# Patient Record
Sex: FEMALE | Race: WHITE
[De-identification: names, ages, dates, MRNs, and addresses within clinical notes are randomized per-mention and may not be internally consistent; named-entity substitution may affect disease eponyms.]

---

## 2018-11-12 ENCOUNTER — HOSPITAL ENCOUNTER (EMERGENCY)
Dept: HOSPITAL 89 - ER | Age: 18
Discharge: HOME | End: 2018-11-12
Payer: COMMERCIAL

## 2018-11-12 VITALS — DIASTOLIC BLOOD PRESSURE: 62 MMHG | SYSTOLIC BLOOD PRESSURE: 111 MMHG

## 2018-11-12 DIAGNOSIS — R42: Primary | ICD-10-CM

## 2018-11-12 DIAGNOSIS — F12.929: ICD-10-CM

## 2018-11-12 LAB — PLATELET COUNT, AUTOMATED: 331 K/UL (ref 150–450)

## 2018-11-12 PROCEDURE — 84450 TRANSFERASE (AST) (SGOT): CPT

## 2018-11-12 PROCEDURE — 99284 EMERGENCY DEPT VISIT MOD MDM: CPT

## 2018-11-12 PROCEDURE — 84075 ASSAY ALKALINE PHOSPHATASE: CPT

## 2018-11-12 PROCEDURE — 82374 ASSAY BLOOD CARBON DIOXIDE: CPT

## 2018-11-12 PROCEDURE — 82947 ASSAY GLUCOSE BLOOD QUANT: CPT

## 2018-11-12 PROCEDURE — 80305 DRUG TEST PRSMV DIR OPT OBS: CPT

## 2018-11-12 PROCEDURE — 82310 ASSAY OF CALCIUM: CPT

## 2018-11-12 PROCEDURE — 84155 ASSAY OF PROTEIN SERUM: CPT

## 2018-11-12 PROCEDURE — 84484 ASSAY OF TROPONIN QUANT: CPT

## 2018-11-12 PROCEDURE — 84460 ALANINE AMINO (ALT) (SGPT): CPT

## 2018-11-12 PROCEDURE — 81001 URINALYSIS AUTO W/SCOPE: CPT

## 2018-11-12 PROCEDURE — 85025 COMPLETE CBC W/AUTO DIFF WBC: CPT

## 2018-11-12 PROCEDURE — 93005 ELECTROCARDIOGRAM TRACING: CPT

## 2018-11-12 PROCEDURE — 80320 DRUG SCREEN QUANTALCOHOLS: CPT

## 2018-11-12 PROCEDURE — 82040 ASSAY OF SERUM ALBUMIN: CPT

## 2018-11-12 PROCEDURE — 82247 BILIRUBIN TOTAL: CPT

## 2018-11-12 PROCEDURE — 84520 ASSAY OF UREA NITROGEN: CPT

## 2018-11-12 PROCEDURE — 82435 ASSAY OF BLOOD CHLORIDE: CPT

## 2018-11-12 PROCEDURE — 84132 ASSAY OF SERUM POTASSIUM: CPT

## 2018-11-12 PROCEDURE — 82565 ASSAY OF CREATININE: CPT

## 2018-11-12 PROCEDURE — 71045 X-RAY EXAM CHEST 1 VIEW: CPT

## 2018-11-12 PROCEDURE — 96374 THER/PROPH/DIAG INJ IV PUSH: CPT

## 2018-11-12 PROCEDURE — 84295 ASSAY OF SERUM SODIUM: CPT

## 2018-11-12 PROCEDURE — 80329 ANALGESICS NON-OPIOID 1 OR 2: CPT

## 2018-11-12 NOTE — EKG
FACILITY: [facility] - [place] 

 

PATIENT NAME: CANDICE KABA

: 2000

MR: [id number]

V: [id number]

EXAM DATE: 

ORDERING PHYSICIAN: MELVIN SANCHES

TECHNOLOGIST: PANTIER

 

Test Reason : 

Blood Pressure : ***/*** mmHG

Vent. Rate : 096 BPM     Atrial Rate : 096 BPM

   P-R Int : 176 ms          QRS Dur : 088 ms

    QT Int : 378 ms       P-R-T Axes : 061 079 033 degrees

   QTc Int : 477 ms

 

Normal sinus rhythm

Prolonged QT

Abnormal ECG

No previous ECGs available

Confirmed by Thomas Laurent ([id number]) on 2018 7:17:06 AM

 

Referred By:             Confirmed By:Thomas Carrillo

## 2018-11-12 NOTE — ER REPORT
History and Physical


Time Seen By MD:  00:44


Hx. of Stated Complaint:  


PATIENT STATES SHE ATE A MARIJUANA EDIBLE GUMMY AROUND 2130, PATIENT STATE SHE 


FELT FINE AT FIRST BUT THEN STARTED FEELING THEN STARTED TO HAVE PAIN ALL OVER, 


WEAK FEELING, A "BURNING IN SPIN", FEELING DIZZY AND NAUSEATED.


HPI/ROS


CHIEF COMPLAINT: Not feeling well, possible bad marijuana edible consumption





HISTORY OF PRESENT ILLNESS: This is a 18-year-old female. She is not feeling 


well after eating a marijuana edible. This was a gummy, eaten around 2130 hours.


She states she ate one. No other edible consumption. Denies any other substance 


abuse such as alcohol, tobacco, or other drugs. She is feeling dizzy. She is 


feeling nauseous. Also feeling a burning sensation throughout her body. Diffuse 


weakness. She has used marijuana in the past without any problems. Prior to this


she was feeling fine and in her normal state of health without any problems. She


feels a little short of breath. Denies chest pain. No abdominal pain. Normal 


bowel and bladder function today.


Allergies:  


Coded Allergies:  


     cat dander (Verified  Allergy, Intermediate, CONGESTION/ITCHY, 11/12/18)


     dog dander (Verified  Allergy, Intermediate, TICHY/CONGESTION, 11/12/18)


Uncoded Allergies:  


     HAY (Allergy, Intermediate, CONGESTION/ITCHY, 11/12/18)


Home Meds


No Active Prescriptions or Reported Meds


Reviewed Nurses Notes:  Yes


Hx Substance Use Disorder:  No


Hx Alcohol Use:  No


Constitutional





Vital Sign - Last 24 Hours








 11/12/18 11/12/18 11/12/18 11/12/18





 00:37 00:45 01:00 01:05


 


Temp 97.3   


 


Pulse 125 107 92 82


 


Resp 24 17 19 15


 


B/P (MAP) 132/83  117/65 (82) 


 


Pulse Ox 98 96 96 96


 


O2 Delivery Room Air   


 


    





 11/12/18 11/12/18 11/12/18 11/12/18





 01:20 01:30 01:35 01:50


 


Pulse 65  90 86


 


Resp 13  9 14


 


B/P (MAP)  120/74 (89)  


 


Pulse Ox 98  98 99





 11/12/18 11/12/18 11/12/18 11/12/18





 01:59 02:04 02:09 02:24


 


Pulse 61 53 51 57


 


Resp 14 13 13 12


 


Pulse Ox 97 98 98 97





 11/12/18 11/12/18 11/12/18 11/12/18





 02:30 02:39 03:00 03:05


 


Pulse  60  53


 


Resp  9  12


 


B/P (MAP) 112/73 (86)  107/72 (84) 


 


Pulse Ox  97  97





 11/12/18 11/12/18 11/12/18 11/12/18





 03:20 03:30 03:35 04:05


 


Pulse 62  73 ???


 


Resp 10  14 


 


B/P (MAP)  120/71 (87)  111/62 (78)


 


Pulse Ox 96  97 








Physical Exam


   General Appearance: She is very groggy but alert and able answer questions. 


No acute distress.


Eyes: Pupils are equal, round. No pallor, injection or icterus. Reactive to 


light. Extraocular movements intact.


ENT: Mucous membranes are moist. Normal oral mucosa. Posterior oropharynx is 


normal.


Neck: Supple and non tender. No lymphadenopathy.


Respiratory: Lungs are clear to auscultation.


Cardiovascular: Regular rate and rhythm. No murmurs, gallops or rubs. Normal 


capillary refill. No edema.


Gastrointestinal:  Abdomen is soft and non tender. Nondistended. Normal active 


bowel sounds. No costovertebral angle tenderness with percussion.


Neurological: Alert and oriented x3. Cranial nerves II through XII show no acute


deficits on my exam. No focal neurologic deficits in the extremities.


Skin: Warm and dry.


Musculoskeletal: Extremities are nontender. No tenderness in palpation of the 


cervical, thoracic and lumbar spine.





DIFFERENTIAL DIAGNOSIS: After history and physical exam, differential diagnosis 


was considered for altered mental status, dizziness and weakness, likely due to 


consumption of the marijuana that he knows what else was in that. We'll look for


other potential causes of this such as medical or metabolic as well.





Medical Decision Making


Data Points


Result Diagram:  


11/12/18 0053                                                                   


            11/12/18 0053





Laboratory





Hematology








Test


 11/12/18


00:53 11/12/18


01:38


 


Red Blood Count


 4.93 M/uL


(4.17-5.56) 





 


Mean Corpuscular Volume


 88.3 fL


(80.0-96.0) 





 


Mean Corpuscular Hemoglobin


 30.4 pg


(26.0-33.0) 





 


Mean Corpuscular Hemoglobin


Concent 34.4 g/dL


(32.0-36.0) 





 


Red Cell Distribution Width


 12.6 %


(11.5-14.5) 





 


Mean Platelet Volume


 8.5 fL


(7.2-11.1) 





 


Neutrophils (%) (Auto)


 42.1 %


(39.4-72.5) 





 


Lymphocytes (%) (Auto)


 50.2 %


(17.6-49.6) 





 


Monocytes (%) (Auto)


 5.4 %


(4.1-12.4) 





 


Eosinophils (%) (Auto)


 1.8 %


(0.4-6.7) 





 


Basophils (%) (Auto)


 0.5 %


(0.3-1.4) 





 


Nucleated RBC Relative Count


(auto) 0.0 /100WBC 


 





 


Neutrophils # (Auto)


 4.0 K/uL


(2.0-7.4) 





 


Lymphocytes # (Auto)


 4.8 K/uL


(1.3-3.6) 





 


Monocytes # (Auto)


 0.5 K/uL


(0.3-1.0) 





 


Eosinophils # (Auto)


 0.2 K/uL


(0.0-0.5) 





 


Basophils # (Auto)


 0.0 K/uL


(0.0-0.1) 





 


Nucleated RBC Absolute Count


(auto) 0.00 K/uL 


 





 


Sodium Level


 141 mmol/L


(137-145) 





 


Potassium Level


 3.2 mmol/L


(3.5-5.0) 





 


Chloride Level


 107 mmol/L


() 





 


Carbon Dioxide Level


 24 mmol/L


(22-31) 





 


Blood Urea Nitrogen


 16 mg/dl


(7-18) 





 


Creatinine


 0.90 mg/dl


(0.52-1.04) 





 


Glomerular Filtration Rate


Calc > 60.0 


 





 


Random Glucose


 121 mg/dl


() 





 


Calcium Level


 9.8 mg/dl


(8.4-10.2) 





 


Total Bilirubin


 0.3 mg/dl


(0.2-1.3) 





 


Aspartate Amino Transf


(AST/SGOT) 28 U/L (0-35) 


 





 


Alanine Aminotransferase


(ALT/SGPT) 31 U/L (0-56) 


 





 


Alkaline Phosphatase 38 U/L (0-126)  


 


Troponin I < 0.012 ng/ml  


 


Total Protein


 7.5 g/dl


(6.3-8.2) 





 


Albumin


 4.2 g/dl


(3.5-5.0) 





 


Salicylates Level < 10 mg/L  


 


Salicylate Last Dose Date unk  


 


Acetaminophen Level < 10 ug/ml  


 


Serum Alcohol < 10 mg/dl  


 


Urine Color  Yellow 


 


Urine Clarity


 


 Slightly-cloudy





 


Urine pH


 


 5.0 pH


(4.8-9.5)


 


Urine Specific Gravity  1.014 


 


Urine Protein


 


 Negative mg/dL


(NEGATIVE)


 


Urine Glucose (UA)


 


 Negative mg/dL


(NEGATIVE)


 


Urine Ketones


 


 Negative mg/dL


(NEGATIVE)


 


Urine Blood


 


 Negative


(NEGATIVE)


 


Urine Nitrite


 


 Negative


(NEGATIVE)


 


Urine Bilirubin


 


 Negative


(NEGATIVE)


 


Urine Urobilinogen


 


 Negative mg/dL


(0.2-1.9)


 


Urine Leukocyte Esterase


 


 Moderate


(NEGATIVE)


 


Urine RBC


 


 1 /HPF


(0-2/HPF)


 


Urine WBC


 


 9 /HPF


(0-5/HPF)


 


Urine Squamous Epithelial


Cells 


 Many /LPF


(</=FEW)


 


Urine Bacteria


 


 Few /HPF


(NONE-FEW)


 


Urine Mucus


 


 Few /HPF


(NONE-FEW)


 


Urine Opiates Screen  Negative 


 


Urine Barbiturates Screen  Negative 


 


Ur Tricyclic Antidepressants


Screen 


 Negative 





 


Urine Phencyclidine Screen  Negative 


 


Urine Amphetamines Screen  Negative 


 


Urine Benzodiazepines Screen  Negative 


 


Urine Cocaine Screen  Negative 


 


Urine Cannabinoids Screen  Positive 








Chemistry








Test


 11/12/18


00:53 11/12/18


01:38


 


White Blood Count


 9.5 k/uL


(4.5-11.0) 





 


Red Blood Count


 4.93 M/uL


(4.17-5.56) 





 


Hemoglobin


 15.0 g/dL


(12.0-16.0) 





 


Hematocrit


 43.6 %


(34.0-47.0) 





 


Mean Corpuscular Volume


 88.3 fL


(80.0-96.0) 





 


Mean Corpuscular Hemoglobin


 30.4 pg


(26.0-33.0) 





 


Mean Corpuscular Hemoglobin


Concent 34.4 g/dL


(32.0-36.0) 





 


Red Cell Distribution Width


 12.6 %


(11.5-14.5) 





 


Platelet Count


 331 K/uL


(150-450) 





 


Mean Platelet Volume


 8.5 fL


(7.2-11.1) 





 


Neutrophils (%) (Auto)


 42.1 %


(39.4-72.5) 





 


Lymphocytes (%) (Auto)


 50.2 %


(17.6-49.6) 





 


Monocytes (%) (Auto)


 5.4 %


(4.1-12.4) 





 


Eosinophils (%) (Auto)


 1.8 %


(0.4-6.7) 





 


Basophils (%) (Auto)


 0.5 %


(0.3-1.4) 





 


Nucleated RBC Relative Count


(auto) 0.0 /100WBC 


 





 


Neutrophils # (Auto)


 4.0 K/uL


(2.0-7.4) 





 


Lymphocytes # (Auto)


 4.8 K/uL


(1.3-3.6) 





 


Monocytes # (Auto)


 0.5 K/uL


(0.3-1.0) 





 


Eosinophils # (Auto)


 0.2 K/uL


(0.0-0.5) 





 


Basophils # (Auto)


 0.0 K/uL


(0.0-0.1) 





 


Nucleated RBC Absolute Count


(auto) 0.00 K/uL 


 





 


Glomerular Filtration Rate


Calc > 60.0 


 





 


Calcium Level


 9.8 mg/dl


(8.4-10.2) 





 


Total Bilirubin


 0.3 mg/dl


(0.2-1.3) 





 


Aspartate Amino Transf


(AST/SGOT) 28 U/L (0-35) 


 





 


Alanine Aminotransferase


(ALT/SGPT) 31 U/L (0-56) 


 





 


Alkaline Phosphatase 38 U/L (0-126)  


 


Troponin I < 0.012 ng/ml  


 


Total Protein


 7.5 g/dl


(6.3-8.2) 





 


Albumin


 4.2 g/dl


(3.5-5.0) 





 


Salicylates Level < 10 mg/L  


 


Salicylate Last Dose Date unk  


 


Acetaminophen Level < 10 ug/ml  


 


Serum Alcohol < 10 mg/dl  


 


Urine Color  Yellow 


 


Urine Clarity


 


 Slightly-cloudy





 


Urine pH


 


 5.0 pH


(4.8-9.5)


 


Urine Specific Gravity  1.014 


 


Urine Protein


 


 Negative mg/dL


(NEGATIVE)


 


Urine Glucose (UA)


 


 Negative mg/dL


(NEGATIVE)


 


Urine Ketones


 


 Negative mg/dL


(NEGATIVE)


 


Urine Blood


 


 Negative


(NEGATIVE)


 


Urine Nitrite


 


 Negative


(NEGATIVE)


 


Urine Bilirubin


 


 Negative


(NEGATIVE)


 


Urine Urobilinogen


 


 Negative mg/dL


(0.2-1.9)


 


Urine Leukocyte Esterase


 


 Moderate


(NEGATIVE)


 


Urine RBC


 


 1 /HPF


(0-2/HPF)


 


Urine WBC


 


 9 /HPF


(0-5/HPF)


 


Urine Squamous Epithelial


Cells 


 Many /LPF


(</=FEW)


 


Urine Bacteria


 


 Few /HPF


(NONE-FEW)


 


Urine Mucus


 


 Few /HPF


(NONE-FEW)


 


Urine Opiates Screen  Negative 


 


Urine Barbiturates Screen  Negative 


 


Ur Tricyclic Antidepressants


Screen 


 Negative 





 


Urine Phencyclidine Screen  Negative 


 


Urine Amphetamines Screen  Negative 


 


Urine Benzodiazepines Screen  Negative 


 


Urine Cocaine Screen  Negative 


 


Urine Cannabinoids Screen  Positive 








Toxicology








Test


 11/12/18


00:53 11/12/18


01:38


 


Salicylates Level < 10 mg/L  


 


Salicylate Last Dose Date unk  


 


Acetaminophen Level < 10 ug/ml  


 


Serum Alcohol < 10 mg/dl  


 


Urine Opiates Screen  Negative 


 


Urine Barbiturates Screen  Negative 


 


Ur Tricyclic Antidepressants


Screen 


 Negative 





 


Urine Phencyclidine Screen  Negative 


 


Urine Amphetamines Screen  Negative 


 


Urine Benzodiazepines Screen  Negative 


 


Urine Cocaine Screen  Negative 


 


Urine Cannabinoids Screen  Positive 








Urinalysis








Test


 11/12/18


01:38


 


Urine Color Yellow 


 


Urine Clarity


 Slightly-cloudy





 


Urine pH


 5.0 pH


(4.8-9.5)


 


Urine Specific Gravity 1.014 


 


Urine Protein


 Negative mg/dL


(NEGATIVE)


 


Urine Glucose (UA)


 Negative mg/dL


(NEGATIVE)


 


Urine Ketones


 Negative mg/dL


(NEGATIVE)


 


Urine Blood


 Negative


(NEGATIVE)


 


Urine Nitrite


 Negative


(NEGATIVE)


 


Urine Bilirubin


 Negative


(NEGATIVE)


 


Urine Urobilinogen


 Negative mg/dL


(0.2-1.9)


 


Urine Leukocyte Esterase


 Moderate


(NEGATIVE)


 


Urine RBC


 1 /HPF


(0-2/HPF)


 


Urine WBC


 9 /HPF


(0-5/HPF)


 


Urine Squamous Epithelial


Cells Many /LPF


(</=FEW)


 


Urine Bacteria


 Few /HPF


(NONE-FEW)


 


Urine Mucus


 Few /HPF


(NONE-FEW)











EKG/Imaging


EKG Interpretation


12 lead EKG:


      Rhythm: normal sinus rhythm, rate 96


      Axis: normal 


      QRS: Prolonged QT


      ST segments: normal


Imaging


AP CHEST 11/12/2018 12:45 AM.


 


INDICATION: dizziness


 


COMPARISON: None.


 


FINDINGS:


 


Lungs are well-expanded. There is no consolidation. No pleural effusion or 


pneumothorax. Heart size is normal.


 


IMPRESSION: No acute abnormality.


 


Report Dictated By: Marty Anderson MD at 11/12/2018 1:25 AM





ED Course/Re-evaluation


Clinical Indication for ER IV:  Hydration, IV Access


ED Course


Labs show cannabis, but nothing else. Normal metabolic panel. She feels better 


after a liter of normal saline and Zofran.


Decision to Disposition Date:  Nov 12, 2018


Decision to Disposition Time:  02:59





Depart


Departure


Latest Vital Signs





Vital Signs








  Date Time  Temp Pulse Resp B/P (MAP) Pulse Ox O2 Delivery O2 Flow Rate FiO2


 


11/12/18 04:05  ???  111/62 (78)    


 


11/12/18 03:35   14  97   


 


11/12/18 00:37 97.3     Room Air  








Impression:  


   Primary Impression:  


   Dizziness


   Additional Impression:  


   Marijuana intoxication


Condition:  Improved


Disposition:  HOME OR SELF-CARE


New Scripts


No Active Prescriptions or Reported Meds


Patient Instructions:  Cannabis Abuse  (ED)





Problem Qualifiers








   Additional Impression:  


   Marijuana intoxication


   Complication of substance-induced condition:  with unspecified complication  


   Qualified Codes:  F12.929 - Cannabis use, unspecified with intoxication, 


   unspecified








MELVIN SANCHES MD             Nov 12, 2018 00:44

## 2018-11-12 NOTE — RADIOLOGY IMAGING REPORT
FACILITY: [facility] - [place] 

 

PATIENT NAME: Aviva Rodriguez

: 2000

MR: [id number]

V: [id number]

EXAM DATE: 

ORDERING PHYSICIAN: MELVIN SANCHES

TECHNOLOGIST: 

 

Location: [facility] - [place]

Patient: Aviva Rodriguez

: 2000

MRN: [id number]

Visit/Account:[id number]

Date of Sevice: 2018

 

ACCESSION #: [id number]

 

 

AP CHEST 2018 12:45 AM.

 

INDICATION: dizziness

 

COMPARISON: None.

 

FINDINGS:

 

 

Lungs are well-expanded. There is no consolidation. No pleural effusion or pneumothorax. Heart size i
s normal.

 

IMPRESSION: No acute abnormality.

 

Report Dictated By: Marty Anderson MD at 2018 1:25 AM

 

Report E-Signed By: Marty Anderson MD  at 2018 1:26 AM

 

[id number]